# Patient Record
Sex: FEMALE | Race: WHITE | NOT HISPANIC OR LATINO | URBAN - METROPOLITAN AREA
[De-identification: names, ages, dates, MRNs, and addresses within clinical notes are randomized per-mention and may not be internally consistent; named-entity substitution may affect disease eponyms.]

---

## 2017-08-07 ENCOUNTER — EMERGENCY (EMERGENCY)
Facility: HOSPITAL | Age: 52
LOS: 1 days | Discharge: HOME | End: 2017-08-07
Attending: EMERGENCY MEDICINE | Admitting: EMERGENCY MEDICINE
Payer: COMMERCIAL

## 2017-08-07 VITALS
RESPIRATION RATE: 16 BRPM | DIASTOLIC BLOOD PRESSURE: 70 MMHG | TEMPERATURE: 98 F | WEIGHT: 134.92 LBS | HEART RATE: 61 BPM | SYSTOLIC BLOOD PRESSURE: 106 MMHG | OXYGEN SATURATION: 98 %

## 2017-08-07 DIAGNOSIS — S92.352A DISPLACED FRACTURE OF FIFTH METATARSAL BONE, LEFT FOOT, INITIAL ENCOUNTER FOR CLOSED FRACTURE: ICD-10-CM

## 2017-08-07 DIAGNOSIS — M79.672 PAIN IN LEFT FOOT: ICD-10-CM
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
## 2017-08-07 PROCEDURE — 99284 EMERGENCY DEPT VISIT MOD MDM: CPT | Mod: 25

## 2017-08-07 PROCEDURE — 99283 EMERGENCY DEPT VISIT LOW MDM: CPT | Mod: 25

## 2017-08-07 PROCEDURE — 73630 X-RAY EXAM OF FOOT: CPT

## 2017-08-07 PROCEDURE — 73630 X-RAY EXAM OF FOOT: CPT | Mod: 26,LT

## 2017-08-07 NOTE — ED ADULT NURSE NOTE - OBJECTIVE STATEMENT
51 y/o female presenting to ER c/o letf ankle pain after falling while exercising" Pain level is 8/10 with movement, pt able to move her toes, + pedal pulse, no deformity noted, decrease ROM. 53 y/o female presenting to ER c/o letf ankle pain after falling while exercising" Pain level is 8/10 with movement, pt presents decreased ROM, but able to wiggle toes, denies numbness, tingling able to move her toes, + pedal pulse, no deformity noted.  taken to x-ray. Will continue to monitor.

## 2017-08-07 NOTE — ED ADULT TRIAGE NOTE - CHIEF COMPLAINT QUOTE
" around 10 am I twisted my left ankle while working out, swollen ankle " noted ace bandage to ankle   sensation and skin intact, able to wiggle toes, denies numbness, tingling

## 2017-08-07 NOTE — ED PROVIDER NOTE - OBJECTIVE STATEMENT
here with pain in left foot after awkward step/rolled ankle at the gym this morning.  Able to ambulate but with pain.  Took otc medicine and applied ace/ice with min relief.  Denies other injury.

## 2017-08-07 NOTE — ED PROVIDER NOTE - MUSCULOSKELETAL, MLM
Spine appears normal, range of motion is not limited, tenderness base 5th metatarsal left foot, mild overlying swelling/redness.  no ankle or tib/fib tenderness. pedal pulse 2+